# Patient Record
Sex: FEMALE | Race: WHITE | NOT HISPANIC OR LATINO | URBAN - METROPOLITAN AREA
[De-identification: names, ages, dates, MRNs, and addresses within clinical notes are randomized per-mention and may not be internally consistent; named-entity substitution may affect disease eponyms.]

---

## 2023-11-20 ENCOUNTER — APPOINTMENT (RX ONLY)
Dept: URBAN - METROPOLITAN AREA CLINIC 41 | Facility: CLINIC | Age: 63
Setting detail: DERMATOLOGY
End: 2023-11-20

## 2023-11-20 DIAGNOSIS — L92.0 GRANULOMA ANNULARE: ICD-10-CM

## 2023-11-20 PROCEDURE — ? INTRAMUSCULAR KENALOG

## 2023-11-20 PROCEDURE — 96372 THER/PROPH/DIAG INJ SC/IM: CPT

## 2023-11-20 PROCEDURE — ? TREATMENT REGIMEN

## 2023-11-20 PROCEDURE — 99203 OFFICE O/P NEW LOW 30 MIN: CPT | Mod: 25

## 2023-11-20 PROCEDURE — ? SEPARATE AND IDENTIFIABLE DOCUMENTATION

## 2023-11-20 PROCEDURE — ? COUNSELING

## 2023-11-20 ASSESSMENT — SEVERITY ASSESSMENT: SEVERITY: MILD

## 2023-11-20 ASSESSMENT — LOCATION ZONE DERM
LOCATION ZONE: TRUNK
LOCATION ZONE: LEG

## 2023-11-20 ASSESSMENT — LOCATION SIMPLE DESCRIPTION DERM
LOCATION SIMPLE: LEFT BUTTOCK
LOCATION SIMPLE: RIGHT THIGH
LOCATION SIMPLE: RIGHT POSTERIOR THIGH
LOCATION SIMPLE: LEFT THIGH

## 2023-11-20 ASSESSMENT — LOCATION DETAILED DESCRIPTION DERM
LOCATION DETAILED: RIGHT PROXIMAL POSTERIOR THIGH
LOCATION DETAILED: LEFT BUTTOCK
LOCATION DETAILED: RIGHT ANTERIOR PROXIMAL THIGH
LOCATION DETAILED: LEFT ANTERIOR PROXIMAL THIGH

## 2023-11-20 NOTE — PROCEDURE: TREATMENT REGIMEN
Plan: Discussed the correlation between GA and hypothyroidism and diabetes, recommended patient check with PCP to see if blood work has been checked recently.
Detail Level: Zone

## 2023-11-20 NOTE — HPI: RASH (PATIENT REPORTED)
Where Is Your Rash Located?: upper thighs
List Prescription Topical Steroids You Tried (Separate Each Name With A Comma):: Clobetasol
Additional Comments (Use Complete Sentences): Patient states the rash has slowly spread over the past 2 years

## 2023-11-20 NOTE — PROCEDURE: INTRAMUSCULAR KENALOG
Expiration Date (Optional): 1/2025
Administered By (Optional): JANA Puente
Detail Level: None
Concentration (Mg/Ml): 40.0
Kenalog Preparation: kenalog
Consent: The risks of atrophy were reviewed with the patient.
Total Volume (Ccs): 1.5
Lot # (Optional): [id number]
Treatment Number (Optional): 1
Concentration (Mg/Ml) Of Additional Medication: 2.5
Add Option For Additional Mediation: No